# Patient Record
Sex: FEMALE | Race: WHITE | NOT HISPANIC OR LATINO | URBAN - METROPOLITAN AREA
[De-identification: names, ages, dates, MRNs, and addresses within clinical notes are randomized per-mention and may not be internally consistent; named-entity substitution may affect disease eponyms.]

---

## 2017-12-21 PROBLEM — Z00.00 ENCOUNTER FOR PREVENTIVE HEALTH EXAMINATION: Status: ACTIVE | Noted: 2017-12-21

## 2018-01-27 ENCOUNTER — INPATIENT (INPATIENT)
Facility: HOSPITAL | Age: 23
LOS: 2 days | Discharge: HOME | End: 2018-01-30
Attending: OBSTETRICS & GYNECOLOGY | Admitting: OBSTETRICS & GYNECOLOGY

## 2018-02-01 DIAGNOSIS — Z3A.41 41 WEEKS GESTATION OF PREGNANCY: ICD-10-CM

## 2018-02-01 DIAGNOSIS — D50.9 IRON DEFICIENCY ANEMIA, UNSPECIFIED: ICD-10-CM

## 2018-02-01 DIAGNOSIS — Z33.1 PREGNANT STATE, INCIDENTAL: ICD-10-CM

## 2019-08-08 ENCOUNTER — ASOB RESULT (OUTPATIENT)
Age: 24
End: 2019-08-08

## 2019-08-08 ENCOUNTER — APPOINTMENT (OUTPATIENT)
Dept: ANTEPARTUM | Facility: CLINIC | Age: 24
End: 2019-08-08
Payer: MEDICAID

## 2019-08-08 PROCEDURE — 76811 OB US DETAILED SNGL FETUS: CPT

## 2019-12-16 ENCOUNTER — INPATIENT (INPATIENT)
Facility: HOSPITAL | Age: 24
LOS: 1 days | Discharge: HOME | End: 2019-12-18
Attending: OBSTETRICS & GYNECOLOGY | Admitting: OBSTETRICS & GYNECOLOGY
Payer: MEDICAID

## 2019-12-16 VITALS — SYSTOLIC BLOOD PRESSURE: 133 MMHG | DIASTOLIC BLOOD PRESSURE: 79 MMHG | HEART RATE: 96 BPM

## 2019-12-16 LAB
AMPHET UR-MCNC: NEGATIVE — SIGNIFICANT CHANGE UP
APPEARANCE UR: ABNORMAL
BACTERIA # UR AUTO: ABNORMAL
BARBITURATES UR SCN-MCNC: NEGATIVE — SIGNIFICANT CHANGE UP
BASOPHILS # BLD AUTO: 0.05 K/UL — SIGNIFICANT CHANGE UP (ref 0–0.2)
BASOPHILS NFR BLD AUTO: 0.4 % — SIGNIFICANT CHANGE UP (ref 0–1)
BENZODIAZ UR-MCNC: NEGATIVE — SIGNIFICANT CHANGE UP
BILIRUB UR-MCNC: NEGATIVE — SIGNIFICANT CHANGE UP
BLD GP AB SCN SERPL QL: SIGNIFICANT CHANGE UP
BUPRENORPHINE SCREEN, URINE RESULT: NEGATIVE — SIGNIFICANT CHANGE UP
COCAINE METAB.OTHER UR-MCNC: NEGATIVE — SIGNIFICANT CHANGE UP
COLOR SPEC: YELLOW — SIGNIFICANT CHANGE UP
DIFF PNL FLD: NEGATIVE — SIGNIFICANT CHANGE UP
EOSINOPHIL # BLD AUTO: 0.34 K/UL — SIGNIFICANT CHANGE UP (ref 0–0.7)
EOSINOPHIL NFR BLD AUTO: 3 % — SIGNIFICANT CHANGE UP (ref 0–8)
EPI CELLS # UR: 5 /HPF — SIGNIFICANT CHANGE UP (ref 0–5)
FENTANYL UR QL: NEGATIVE — SIGNIFICANT CHANGE UP
GLUCOSE UR QL: NEGATIVE — SIGNIFICANT CHANGE UP
HCT VFR BLD CALC: 37.5 % — SIGNIFICANT CHANGE UP (ref 37–47)
HGB BLD-MCNC: 12.1 G/DL — SIGNIFICANT CHANGE UP (ref 12–16)
HYALINE CASTS # UR AUTO: 3 /LPF — SIGNIFICANT CHANGE UP (ref 0–7)
IMM GRANULOCYTES NFR BLD AUTO: 1.1 % — HIGH (ref 0.1–0.3)
KETONES UR-MCNC: SIGNIFICANT CHANGE UP
L&D DRUG SCREEN, URINE: SIGNIFICANT CHANGE UP
LEUKOCYTE ESTERASE UR-ACNC: ABNORMAL
LYMPHOCYTES # BLD AUTO: 1.81 K/UL — SIGNIFICANT CHANGE UP (ref 1.2–3.4)
LYMPHOCYTES # BLD AUTO: 15.8 % — LOW (ref 20.5–51.1)
MCHC RBC-ENTMCNC: 28.3 PG — SIGNIFICANT CHANGE UP (ref 27–31)
MCHC RBC-ENTMCNC: 32.3 G/DL — SIGNIFICANT CHANGE UP (ref 32–37)
MCV RBC AUTO: 87.6 FL — SIGNIFICANT CHANGE UP (ref 81–99)
METHADONE UR-MCNC: NEGATIVE — SIGNIFICANT CHANGE UP
MONOCYTES # BLD AUTO: 1.28 K/UL — HIGH (ref 0.1–0.6)
MONOCYTES NFR BLD AUTO: 11.2 % — HIGH (ref 1.7–9.3)
NEUTROPHILS # BLD AUTO: 7.84 K/UL — HIGH (ref 1.4–6.5)
NEUTROPHILS NFR BLD AUTO: 68.5 % — SIGNIFICANT CHANGE UP (ref 42.2–75.2)
NITRITE UR-MCNC: NEGATIVE — SIGNIFICANT CHANGE UP
NRBC # BLD: 0 /100 WBCS — SIGNIFICANT CHANGE UP (ref 0–0)
OPIATES UR-MCNC: NEGATIVE — SIGNIFICANT CHANGE UP
OXYCODONE UR-MCNC: NEGATIVE — SIGNIFICANT CHANGE UP
PCP UR-MCNC: NEGATIVE — SIGNIFICANT CHANGE UP
PH UR: 6 — SIGNIFICANT CHANGE UP (ref 5–8)
PLATELET # BLD AUTO: 183 K/UL — SIGNIFICANT CHANGE UP (ref 130–400)
PRENATAL SYPHILIS TEST: SIGNIFICANT CHANGE UP
PROPOXYPHENE QUALITATIVE URINE RESULT: NEGATIVE — SIGNIFICANT CHANGE UP
PROT UR-MCNC: ABNORMAL
RBC # BLD: 4.28 M/UL — SIGNIFICANT CHANGE UP (ref 4.2–5.4)
RBC # FLD: 14.3 % — SIGNIFICANT CHANGE UP (ref 11.5–14.5)
RBC CASTS # UR COMP ASSIST: 5 /HPF — HIGH (ref 0–4)
SP GR SPEC: 1.04 — HIGH (ref 1.01–1.02)
UROBILINOGEN FLD QL: SIGNIFICANT CHANGE UP
WBC # BLD: 11.45 K/UL — HIGH (ref 4.8–10.8)
WBC # FLD AUTO: 11.45 K/UL — HIGH (ref 4.8–10.8)
WBC UR QL: 21 /HPF — HIGH (ref 0–5)

## 2019-12-16 PROCEDURE — 59400 OBSTETRICAL CARE: CPT | Mod: U9

## 2019-12-16 RX ORDER — SIMETHICONE 80 MG/1
80 TABLET, CHEWABLE ORAL EVERY 4 HOURS
Refills: 0 | Status: DISCONTINUED | OUTPATIENT
Start: 2019-12-16 | End: 2019-12-18

## 2019-12-16 RX ORDER — DIBUCAINE 1 %
1 OINTMENT (GRAM) RECTAL EVERY 6 HOURS
Refills: 0 | Status: DISCONTINUED | OUTPATIENT
Start: 2019-12-16 | End: 2019-12-18

## 2019-12-16 RX ORDER — OXYTOCIN 10 UNIT/ML
41.67 VIAL (ML) INJECTION
Qty: 20 | Refills: 0 | Status: DISCONTINUED | OUTPATIENT
Start: 2019-12-16 | End: 2019-12-18

## 2019-12-16 RX ORDER — DINOPROSTONE 10 MG/241MG
10 INSERT VAGINAL ONCE
Refills: 0 | Status: COMPLETED | OUTPATIENT
Start: 2019-12-16 | End: 2019-12-16

## 2019-12-16 RX ORDER — OXYTOCIN 10 UNIT/ML
2 VIAL (ML) INJECTION
Qty: 30 | Refills: 0 | Status: DISCONTINUED | OUTPATIENT
Start: 2019-12-16 | End: 2019-12-18

## 2019-12-16 RX ORDER — IBUPROFEN 200 MG
600 TABLET ORAL EVERY 6 HOURS
Refills: 0 | Status: DISCONTINUED | OUTPATIENT
Start: 2019-12-16 | End: 2019-12-18

## 2019-12-16 RX ORDER — HYDROCORTISONE 1 %
1 OINTMENT (GRAM) TOPICAL EVERY 6 HOURS
Refills: 0 | Status: DISCONTINUED | OUTPATIENT
Start: 2019-12-16 | End: 2019-12-18

## 2019-12-16 RX ORDER — OXYTOCIN 10 UNIT/ML
333.33 VIAL (ML) INJECTION
Qty: 20 | Refills: 0 | Status: DISCONTINUED | OUTPATIENT
Start: 2019-12-16 | End: 2019-12-18

## 2019-12-16 RX ORDER — AMPICILLIN TRIHYDRATE 250 MG
1 CAPSULE ORAL EVERY 4 HOURS
Refills: 0 | Status: DISCONTINUED | OUTPATIENT
Start: 2019-12-16 | End: 2019-12-16

## 2019-12-16 RX ORDER — OXYTOCIN 10 UNIT/ML
1 VIAL (ML) INJECTION
Qty: 30 | Refills: 0 | Status: DISCONTINUED | OUTPATIENT
Start: 2019-12-16 | End: 2019-12-18

## 2019-12-16 RX ORDER — KETOROLAC TROMETHAMINE 30 MG/ML
30 SYRINGE (ML) INJECTION ONCE
Refills: 0 | Status: DISCONTINUED | OUTPATIENT
Start: 2019-12-16 | End: 2019-12-16

## 2019-12-16 RX ORDER — MAGNESIUM HYDROXIDE 400 MG/1
30 TABLET, CHEWABLE ORAL
Refills: 0 | Status: DISCONTINUED | OUTPATIENT
Start: 2019-12-16 | End: 2019-12-18

## 2019-12-16 RX ORDER — OXYCODONE HYDROCHLORIDE 5 MG/1
5 TABLET ORAL
Refills: 0 | Status: DISCONTINUED | OUTPATIENT
Start: 2019-12-16 | End: 2019-12-18

## 2019-12-16 RX ORDER — AER TRAVELER 0.5 G/1
1 SOLUTION RECTAL; TOPICAL EVERY 4 HOURS
Refills: 0 | Status: DISCONTINUED | OUTPATIENT
Start: 2019-12-16 | End: 2019-12-18

## 2019-12-16 RX ORDER — SODIUM CHLORIDE 9 MG/ML
1000 INJECTION, SOLUTION INTRAVENOUS
Refills: 0 | Status: DISCONTINUED | OUTPATIENT
Start: 2019-12-16 | End: 2019-12-16

## 2019-12-16 RX ORDER — IBUPROFEN 200 MG
600 TABLET ORAL EVERY 6 HOURS
Refills: 0 | Status: COMPLETED | OUTPATIENT
Start: 2019-12-16 | End: 2020-11-13

## 2019-12-16 RX ORDER — LANOLIN
1 OINTMENT (GRAM) TOPICAL EVERY 6 HOURS
Refills: 0 | Status: DISCONTINUED | OUTPATIENT
Start: 2019-12-16 | End: 2019-12-18

## 2019-12-16 RX ORDER — AMPICILLIN TRIHYDRATE 250 MG
2 CAPSULE ORAL ONCE
Refills: 0 | Status: COMPLETED | OUTPATIENT
Start: 2019-12-16 | End: 2019-12-16

## 2019-12-16 RX ORDER — GLYCERIN ADULT
1 SUPPOSITORY, RECTAL RECTAL AT BEDTIME
Refills: 0 | Status: DISCONTINUED | OUTPATIENT
Start: 2019-12-16 | End: 2019-12-18

## 2019-12-16 RX ORDER — OXYCODONE HYDROCHLORIDE 5 MG/1
5 TABLET ORAL ONCE
Refills: 0 | Status: DISCONTINUED | OUTPATIENT
Start: 2019-12-16 | End: 2019-12-18

## 2019-12-16 RX ORDER — DIPHENHYDRAMINE HCL 50 MG
25 CAPSULE ORAL EVERY 6 HOURS
Refills: 0 | Status: DISCONTINUED | OUTPATIENT
Start: 2019-12-16 | End: 2019-12-18

## 2019-12-16 RX ORDER — ACETAMINOPHEN 500 MG
975 TABLET ORAL
Refills: 0 | Status: DISCONTINUED | OUTPATIENT
Start: 2019-12-16 | End: 2019-12-18

## 2019-12-16 RX ORDER — BENZOCAINE 10 %
1 GEL (GRAM) MUCOUS MEMBRANE EVERY 6 HOURS
Refills: 0 | Status: DISCONTINUED | OUTPATIENT
Start: 2019-12-16 | End: 2019-12-18

## 2019-12-16 RX ADMIN — Medication 216 GRAM(S): at 01:43

## 2019-12-16 RX ADMIN — Medication 30 MILLIGRAM(S): at 18:09

## 2019-12-16 RX ADMIN — Medication 108 GRAM(S): at 14:52

## 2019-12-16 RX ADMIN — Medication 1 ENEMA: at 08:47

## 2019-12-16 RX ADMIN — Medication 1 MILLIUNIT(S)/MIN: at 10:14

## 2019-12-16 RX ADMIN — DINOPROSTONE 10 MILLIGRAM(S): 10 INSERT VAGINAL at 02:25

## 2019-12-16 RX ADMIN — Medication 125 MILLIUNIT(S)/MIN: at 18:13

## 2019-12-16 RX ADMIN — Medication 108 GRAM(S): at 10:50

## 2019-12-16 RX ADMIN — Medication 108 GRAM(S): at 05:40

## 2019-12-16 NOTE — PROGRESS NOTE ADULT - ASSESSMENT
A/P:  24y  at 41w0d, GBS pos, Rh neg, s/p rhogam, IOL for post EDC, s/p cervidil, currently on pitocin, s/p AROM  -cont EFM/toco  -clear liquid diet, IVF  -pain management with epidural  -cont ampicillin for GBS prophylaxis  -f/u ucx, UDS    Dr. Burrell and Dr. Myers aware.

## 2019-12-16 NOTE — OB PROVIDER H&P - NSHPPHYSICALEXAM_GEN_ALL_CORE
Vital Signs Last 24 Hrs  T(C): 37 (16 Dec 2019 01:19), Max: 37 (16 Dec 2019 01:19)  T(F): 98.6 (16 Dec 2019 01:19), Max: 98.6 (16 Dec 2019 01:19)  HR: 96 (16 Dec 2019 01:19) (96 - 96)  BP: 133/79 (16 Dec 2019 01:19) (133/79 - 133/79)  BP(mean): --  RR: 22 (16 Dec 2019 01:19) (22 - 22)  SpO2: --  Gen: NAD, A&OX3  Abd: gravid soft nt no palpable ctx  VE: C/60/-2, soft, vtx by sono, intact

## 2019-12-16 NOTE — PROGRESS NOTE ADULT - SUBJECTIVE AND OBJECTIVE BOX
PGY1 Note    Patient seen at bedside for evaluation of labor progression, doing well, no complaints.     Vital Signs Last 24 Hrs  T(C): 37 (16 Dec 2019 01:19), Max: 37 (16 Dec 2019 01:19)  T(F): 98.6 (16 Dec 2019 01:19), Max: 98.6 (16 Dec 2019 01:19)  HR: 89 (16 Dec 2019 11:41) (85 - 114)  BP: 114/67 (16 Dec 2019 11:36) (99/57 - 135/78)  RR: 22 (16 Dec 2019 01:19) (22 - 22)  SpO2: 100% (16 Dec 2019 11:36) (87% - 100%)      EFM: 130/mod gricel/+accels  TOCO: q2-3 mins  SVE: 2/70/-2, vtx, intact    Medications:  ampicillin  IVPB: 216 mL/Hr (19 @ 01:43)  ampicillin  IVPB: 108 mL/Hr (19 @ 10:50),  108 mL/Hr (19 @ 05:40)  dinoprostone Insert: 10 milliGRAM(s) (19 @ 02:25) - Cervidil out at 1000  oxytocin Infusion: 1 mL/Hr (19 @ 10:04) currently @4mU/min  saline laxative (FLEET) Rectal Enema: 1 Enema (19 @ 08:47)      Labs:                        12.1   11.45 )-----------( 183      ( 16 Dec 2019 02:08 )             37.5           ABO RH Interpretation: A NEG (19 @ 07:13)    Antibody Screen: NEG (19 @ 07:13)    Urinalysis Basic - ( 16 Dec 2019 02:08 )    Color: Yellow / Appearance: Slightly Turbid / S.038 / pH: x  Gluc: x / Ketone: Trace  / Bili: Negative / Urobili: <2 mg/dL   Blood: x / Protein: 30 mg/dL / Nitrite: Negative   Leuk Esterase: Moderate / RBC: 5 /HPF / WBC 21 /HPF   Sq Epi: x / Non Sq Epi: 5 /HPF / Bacteria: Few      L&amp;D Drug Screen, Urine: Done (12-16-19 @ 02:08)    Prenatal Syphilis Test: Nonreact (19 @ 02:08)

## 2019-12-16 NOTE — OB PROVIDER H&P - NSHPLABSRESULTS_GEN_ALL_CORE
GCT 88  Refused AFP  Bile acids drawn 12/13 pending      Rhogam given 4/11/19, 9/20/19    Sonograms:   6wks: IUP, subchorionic hemorrhage  4/15/2019 SIUP  7.2wks: SIUP, clot noted  8.1wks: SIUP, clot noted  8/8/19 anterior placenta, MVP nl, AGA, normal anatomy, CL 4.45cm, normal ovaries b/l

## 2019-12-16 NOTE — OB PROVIDER H&P - NS_OBGYNHISTORY_OBGYN_ALL_OB_FT
OB: 10/13/16 M 40wks 8/8 [facility] uncomplicated  1/28/2018 F 41wk 8/14 [facility] uncomplicated  GYN: Denies abnormal paps, fibroids, cysts, STIs

## 2019-12-16 NOTE — OB PROVIDER DELIVERY SUMMARY - NSPROVIDERDELIVERYNOTE_OBGYN_ALL_OB_FT
Patient fully dilated, OA, pushed to deliver viable .  Apgar 9/9.  Placenta intact with 3vc.  Cervix, vagina intact.  First degree perineal laceration repaired with 3-0 chromic.   cc.  Tolerated well.  Infant to wbn.  mother received GBS prophylaxis.

## 2019-12-16 NOTE — OB PROVIDER H&P - ASSESSMENT
23yo  at 41wks GBS pos, RH neg s/o rhogam for IOL for postdates    - Admit to L&D  - Admission labs  - IV fluids  - Cont toco and efm  - Clear liquid diet  - Pain management PRN  - Cervidil IOL  - Amp      Dr. Burrell aware

## 2019-12-16 NOTE — PROGRESS NOTE ADULT - ASSESSMENT
A/P:   24y  at 41w0d, GBS pos, Rh neg, s/p rhogam, IOL for post EDC, s/p cervidil, currently on pitocin    -continue pitocin and ampicillin  -pain management prn  -cont efm/toco  -f/u pending labs  -cont to monitor vitals  -cont iv hydration    Will make Dr. Burrell and Dr. Myers aware

## 2019-12-16 NOTE — PROGRESS NOTE ADULT - SUBJECTIVE AND OBJECTIVE BOX
PGY 1 Note    Patient seen at bedside for evaluation of labor progression/decels.  Reports painful/no ctx. Comfortable s/p epidural.    Vital Signs Last 24 Hrs  T(C): 36.8 (16 Dec 2019 14:59), Max: 37 (16 Dec 2019 01:19)  T(F): 98.24 (16 Dec 2019 14:59), Max: 98.6 (16 Dec 2019 01:19)  HR: 79 (16 Dec 2019 15:26) (77 - 114)  BP: 114/70 (16 Dec 2019 15:26) (98/51 - 135/78)  BP(mean): --  RR: 16 (16 Dec 2019 14:59) (16 - 22)  SpO2: 100% (16 Dec 2019 15:26) (87% - 100%)      EFM:  TOCO:  SVE:     Labs:                        12.1   11.45 )-----------( 183      ( 16 Dec 2019 02:08 )             37.5           ABO RH Interpretation: A NEG (19 @ 07:13)    Urinalysis Basic - ( 16 Dec 2019 02:08 )    Color: Yellow / Appearance: Slightly Turbid / S.038 / pH: x  Gluc: x / Ketone: Trace  / Bili: Negative / Urobili: <2 mg/dL   Blood: x / Protein: 30 mg/dL / Nitrite: Negative   Leuk Esterase: Moderate / RBC: 5 /HPF / WBC 21 /HPF   Sq Epi: x / Non Sq Epi: 5 /HPF / Bacteria: Few          Meds: ampicillin  IVPB 1 Gram(s) IV Intermittent every 4 hours  oxytocin Infusion 1 milliUNIT(s)/Min IV Continuous <Continuous>  ampicillin  IVPB 2 Gram(s) IV Intermittent once  dinoprostone Insert 10 milliGRAM(s) Vaginal once  saline laxative (FLEET) Rectal Enema 1 Enema Rectal once      A/P:  24y Gestational Age  , in labor  -cont EFM/toco  -clear liquid diet, IVF  -pain management prn/with epidural  -f/u    Dr. marvin. PGY 1 Note    Patient seen at bedside for evaluation of labor progression.  Comfortable s/p epidural.      Vital Signs Last 24 Hrs  T(C): 36.8 (16 Dec 2019 14:59), Max: 37 (16 Dec 2019 01:19)  T(F): 98.24 (16 Dec 2019 14:59), Max: 98.6 (16 Dec 2019 01:19)  HR: 79 (16 Dec 2019 15:26) (77 - 114)  BP: 114/70 (16 Dec 2019 15:26) (98/51 - 135/78)  RR: 16 (16 Dec 2019 14:59) (16 - 22)  SpO2: 100% (16 Dec 2019 15:26) (87% - 100%)      EFM:  TOCO:  SVE:     Labs:                        12.1   11.45 )-----------( 183      ( 16 Dec 2019 02:08 )             37.5           ABO RH Interpretation: A NEG (19 @ 07:13)  antibody neg    Urinalysis Basic - ( 16 Dec 2019 02:08 )    Color: Yellow / Appearance: Slightly Turbid / S.038 / pH: x  Gluc: x / Ketone: Trace  / Bili: Negative / Urobili: <2 mg/dL   Blood: x / Protein: 30 mg/dL / Nitrite: Negative   Leuk Esterase: Moderate / RBC: 5 /HPF / WBC 21 /HPF   Sq Epi: x / Non Sq Epi: 5 /HPF / Bacteria: Few      UDS neg  RPR NR          Meds: ampicillin  IVPB 1 Gram(s) IV Intermittent every 4 hours  oxytocin Infusion 1 milliUNIT(s)/Min IV Continuous <Continuous>  ampicillin  IVPB 2 Gram(s) IV Intermittent once  dinoprostone Insert 10 milliGRAM(s) Vaginal once  saline laxative (FLEET) Rectal Enema 1 Enema Rectal once

## 2019-12-16 NOTE — OB RN PATIENT PROFILE - ALCOHOL USE HISTORY SINGLE SELECT
Other (Free Text): SK right  upper area with purpura same site. Reassurance. No treatment Note Text (......Xxx Chief Complaint.): This diagnosis correlates with the Detail Level: Detailed Other (Free Text): No pigment or nodules at scar. No enlarged lymph nodes left supratrapezius region, axillae, or groins never

## 2019-12-16 NOTE — OB PROVIDER H&P - ATTENDING COMMENTS
23 y/o p2002 at 41 weeks with a history of PUPPS this pregnancy presents for induction of labor secondary to post dates and PUPPS.  Good fm, no rom, no bleeding    pnc: nsd x 2, no problems  rh neg, received Rhogam  gbs +  abd: d=9876 g, nt  ext: no edema  cx: 1/50/-2/i/adeq  nst: reactive  toco: irreg ctx  admit, analgesia, abx, cervidil, r/b/a rev, questions answered, then Pitocin - r/b/a reviewed, questions answered, anticipate nsd

## 2019-12-16 NOTE — PROGRESS NOTE ADULT - SUBJECTIVE AND OBJECTIVE BOX
PGY 1 Note    Patient seen at bedside for evaluation of labor progression. Comfortable s/p epidural.  S/p AROM clear @1314.    Vital Signs Last 24 Hrs  T(C): 37 (16 Dec 2019 01:19), Max: 37 (16 Dec 2019 01:19)  T(F): 98.6 (16 Dec 2019 01:19), Max: 98.6 (16 Dec 2019 01:19)  HR: 98 (16 Dec 2019 13:21) (77 - 114)  BP: 98/51 (16 Dec 2019 13:08) (98/51 - 135/78)  RR: 18 (16 Dec 2019 12:53) (18 - 22)  SpO2: 99% (16 Dec 2019 13:16) (87% - 100%)      EFM: 130/mod/+accel, cat I  TOCO: q2-3m  SVE: 3/80/-2, AROM clear @1314    Labs:                        12.1   11.45 )-----------( 183      ( 16 Dec 2019 02:08 )             37.5           ABO RH Interpretation: A NEG (19 @ 07:13)  antibody neg  RPR NR    Urinalysis Basic - ( 16 Dec 2019 02:08 )    Color: Yellow / Appearance: Slightly Turbid / S.038 / pH: x  Gluc: x / Ketone: Trace  / Bili: Negative / Urobili: <2 mg/dL   Blood: x / Protein: 30 mg/dL / Nitrite: Negative   Leuk Esterase: Moderate / RBC: 5 /HPF / WBC 21 /HPF   Sq Epi: x / Non Sq Epi: 5 /HPF / Bacteria: Few      Meds: ampicillin  IVPB 1 Gram(s) IV Intermittent every 4 hours, last dose @1050  oxytocin Infusion 1 milliUNIT(s)/Min IV Continuous <Continuous>, started  @1309, now at 2mu/min  dinoprostone Insert 10 milliGRAM(s) Vaginal once  saline laxative (FLEET) Rectal Enema 1 Enema Rectal once

## 2019-12-16 NOTE — PROGRESS NOTE ADULT - ASSESSMENT
A/P:  24y  @41w0d, GBS pos, Rh neg, IOL for postdates  -cont cervidil for induction  -cont EFM/toco  -clear liquid diet, IVF  -pain management prn/with epidural  -f/u ucx, UDS    Dr. Burrell and Dr. Myers aware. A/P:  24y  @41w0d, GBS pos, Rh neg, IOL for postdates  -cont cervidil for induction  -cont EFM/toco  -clear liquid diet, IVF  -pain management prn  -cont ampicillin for GBS prophylaxis  -f/u ucx, UDS    Dr. Burrell and Dr. Myers aware.

## 2019-12-16 NOTE — PROGRESS NOTE ADULT - SUBJECTIVE AND OBJECTIVE BOX
PGY 1 Note    Patient seen at bedside for evaluation of labor progression.  Cervidil in, patient feels some cramping but not yet uncomfortable.     Vital Signs Last 24 Hrs  T(C): 37 (16 Dec 2019 01:19), Max: 37 (16 Dec 2019 01:19)  T(F): 98.6 (16 Dec 2019 01:19), Max: 98.6 (16 Dec 2019 01:19)  HR: 92 (16 Dec 2019 03:43) (92 - 96)  BP: 109/80 (16 Dec 2019 03:43) (109/80 - 133/79)  RR: 22 (16 Dec 2019 01:19) (22 - 22)    EFM: 120/mod/+accel, cat I  TOCO: q3m  SVE: 2/50/-2 @0818 per Dr. Burrell    Labs:                        12.1   11.45 )-----------( 183      ( 16 Dec 2019 02:08 )             37.5           ABO RH Interpretation: A NEG (19 @ 07:13)  antibody neg  RPR NR    Urinalysis Basic - ( 16 Dec 2019 02:08 )    Color: Yellow / Appearance: Slightly Turbid / S.038 / pH: x  Gluc: x / Ketone: Trace  / Bili: Negative / Urobili: <2 mg/dL   Blood: x / Protein: 30 mg/dL / Nitrite: Negative   Leuk Esterase: Moderate / RBC: 5 /HPF / WBC 21 /HPF   Sq Epi: x / Non Sq Epi: 5 /HPF / Bacteria: Few        Meds: ampicillin  IVPB 1 Gram(s) IV Intermittent every 4 hours  saline laxative (FLEET) Rectal Enema 1 Enema Rectal once  dinoprostone Insert 10 milliGRAM(s) Vaginal once  epidural @0830 PGY 1 Note    Patient seen at bedside for evaluation of labor progression.  Cervidil in, patient feels some cramping but not yet uncomfortable.     Vital Signs Last 24 Hrs  T(C): 37 (16 Dec 2019 01:19), Max: 37 (16 Dec 2019 01:19)  T(F): 98.6 (16 Dec 2019 01:19), Max: 98.6 (16 Dec 2019 01:19)  HR: 92 (16 Dec 2019 03:43) (92 - 96)  BP: 109/80 (16 Dec 2019 03:43) (109/80 - 133/79)  RR: 22 (16 Dec 2019 01:19) (22 - 22)    EFM: 120/mod/+accel, cat I  TOCO: q3m  SVE: 2/50/-2 @0818 per Dr. Burrell    Labs:                        12.1   11.45 )-----------( 183      ( 16 Dec 2019 02:08 )             37.5           ABO RH Interpretation: A NEG (19 @ 07:13)  antibody neg  RPR NR    Urinalysis Basic - ( 16 Dec 2019 02:08 )    Color: Yellow / Appearance: Slightly Turbid / S.038 / pH: x  Gluc: x / Ketone: Trace  / Bili: Negative / Urobili: <2 mg/dL   Blood: x / Protein: 30 mg/dL / Nitrite: Negative   Leuk Esterase: Moderate / RBC: 5 /HPF / WBC 21 /HPF   Sq Epi: x / Non Sq Epi: 5 /HPF / Bacteria: Few        Meds: ampicillin  IVPB 1 Gram(s) IV Intermittent every 4 hours  saline laxative (FLEET) Rectal Enema 1 Enema Rectal once  dinoprostone Insert 10 milliGRAM(s) Vaginal once

## 2019-12-16 NOTE — PROGRESS NOTE ADULT - ASSESSMENT
A/P:  25yo  at 41w0d, GBS pos, Rh neg, s/p rhogam, r/o gHTN, IOL for post EDC, s/p cervidil, currently on pitocin, s/p AROM  -cont EFM/toco  -clear liquid diet, IVF  -pain management prn/with epidural  -f/u ucx    Dr. marvin.

## 2019-12-17 LAB
BASOPHILS # BLD AUTO: 0.05 K/UL — SIGNIFICANT CHANGE UP (ref 0–0.2)
BASOPHILS NFR BLD AUTO: 0.5 % — SIGNIFICANT CHANGE UP (ref 0–1)
CULTURE RESULTS: SIGNIFICANT CHANGE UP
EOSINOPHIL # BLD AUTO: 0.34 K/UL — SIGNIFICANT CHANGE UP (ref 0–0.7)
EOSINOPHIL NFR BLD AUTO: 3.4 % — SIGNIFICANT CHANGE UP (ref 0–8)
HCT VFR BLD CALC: 33.6 % — LOW (ref 37–47)
HGB BLD-MCNC: 10.6 G/DL — LOW (ref 12–16)
IMM GRANULOCYTES NFR BLD AUTO: 1.2 % — HIGH (ref 0.1–0.3)
LYMPHOCYTES # BLD AUTO: 1.73 K/UL — SIGNIFICANT CHANGE UP (ref 1.2–3.4)
LYMPHOCYTES # BLD AUTO: 17.4 % — LOW (ref 20.5–51.1)
MCHC RBC-ENTMCNC: 28.3 PG — SIGNIFICANT CHANGE UP (ref 27–31)
MCHC RBC-ENTMCNC: 31.5 G/DL — LOW (ref 32–37)
MCV RBC AUTO: 89.6 FL — SIGNIFICANT CHANGE UP (ref 81–99)
MONOCYTES # BLD AUTO: 1.1 K/UL — HIGH (ref 0.1–0.6)
MONOCYTES NFR BLD AUTO: 11.1 % — HIGH (ref 1.7–9.3)
NEUTROPHILS # BLD AUTO: 6.6 K/UL — HIGH (ref 1.4–6.5)
NEUTROPHILS NFR BLD AUTO: 66.4 % — SIGNIFICANT CHANGE UP (ref 42.2–75.2)
NRBC # BLD: 0 /100 WBCS — SIGNIFICANT CHANGE UP (ref 0–0)
PLATELET # BLD AUTO: 145 K/UL — SIGNIFICANT CHANGE UP (ref 130–400)
RBC # BLD: 3.75 M/UL — LOW (ref 4.2–5.4)
RBC # FLD: 14.3 % — SIGNIFICANT CHANGE UP (ref 11.5–14.5)
SPECIMEN SOURCE: SIGNIFICANT CHANGE UP
WBC # BLD: 9.94 K/UL — SIGNIFICANT CHANGE UP (ref 4.8–10.8)
WBC # FLD AUTO: 9.94 K/UL — SIGNIFICANT CHANGE UP (ref 4.8–10.8)

## 2019-12-17 RX ADMIN — Medication 600 MILLIGRAM(S): at 05:38

## 2019-12-17 RX ADMIN — Medication 975 MILLIGRAM(S): at 03:52

## 2019-12-17 RX ADMIN — Medication 25 MILLIGRAM(S): at 23:25

## 2019-12-17 RX ADMIN — Medication 600 MILLIGRAM(S): at 23:17

## 2019-12-17 NOTE — PROGRESS NOTE ADULT - SUBJECTIVE AND OBJECTIVE BOX
OB attending  PPD #1    Pt doing well, pain well controlled. No overnight events, no acute complaints.    Ambulating: Yes  Voiding: Yes  Flatus: Yes  Bowel movements: Yes   Breast or bottle feeding: Breastfeeding  Diet: Regular    PAST MEDICAL & SURGICAL HISTORY:  No pertinent past medical history  No significant past surgical history      Physical Exam  Vital Signs Last 24 Hrs  T(C): 36.5 (17 Dec 2019 07:54), Max: 37.1 (16 Dec 2019 17:32)  T(F): 97.7 (17 Dec 2019 07:54), Max: 98.8 (16 Dec 2019 17:32)  HR: 89 (17 Dec 2019 07:54) (61 - 141)  BP: 109/71 (17 Dec 2019 07:54) (93/50 - 137/65)  BP(mean): --  RR: 18 (17 Dec 2019 07:54) (16 - 20)  SpO2: 81% (16 Dec 2019 17:12) (81% - 100%)  Gen: AAOx3, NAD  Abd: Soft, nontender, nondistended, BS+  Fundus: Firm, below umbilicus  Lochia: normal  Ext: No calf tenderness, no swelling    Labs:                        10.6   9.94  )-----------( 145      ( 17 Dec 2019 05:44 )             33.6         A/P: s/p , PPD #1, doing well  - continue current management

## 2019-12-18 ENCOUNTER — TRANSCRIPTION ENCOUNTER (OUTPATIENT)
Age: 24
End: 2019-12-18

## 2019-12-18 VITALS
RESPIRATION RATE: 18 BRPM | TEMPERATURE: 97 F | DIASTOLIC BLOOD PRESSURE: 78 MMHG | SYSTOLIC BLOOD PRESSURE: 122 MMHG | HEART RATE: 80 BPM

## 2019-12-18 RX ORDER — IBUPROFEN 200 MG
1 TABLET ORAL
Qty: 0 | Refills: 0 | DISCHARGE
Start: 2019-12-18

## 2019-12-18 RX ADMIN — Medication 25 MILLIGRAM(S): at 08:01

## 2019-12-18 NOTE — PROGRESS NOTE ADULT - SUBJECTIVE AND OBJECTIVE BOX
OB attending  PPD #2    Pt doing well, pain well controlled. No overnight events, no acute complaints.    Ambulating: Yes  Voiding: Yes  Flatus: Yes  Bowel movements: Yes   Breast or bottle feeding: Breastfeeding  Diet: Regular    PAST MEDICAL & SURGICAL HISTORY:  No pertinent past medical history  No significant past surgical history      Physical Exam  Vital Signs Last 24 Hrs  T(C): 36 (18 Dec 2019 07:30), Max: 36.6 (17 Dec 2019 15:53)  T(F): 96.8 (18 Dec 2019 07:30), Max: 97.8 (17 Dec 2019 15:53)  HR: 80 (18 Dec 2019 07:30) (80 - 81)  BP: 122/78 (18 Dec 2019 07:30) (118/75 - 126/72)  BP(mean): --  RR: 18 (18 Dec 2019 07:30) (18 - 18)  SpO2: --  Gen: AAOx3, NAD  Abd: Soft, nontender, nondistended, BS+  Fundus: Firm, below umbilicus  Lochia: normal  Ext: No calf tenderness, no swelling    Labs:                        10.6   9.94  )-----------( 145      ( 17 Dec 2019 05:44 )             33.6         A/P: s/p , PPD #2, doing well  - d/c home

## 2019-12-18 NOTE — DISCHARGE NOTE NURSING/CASE MANAGEMENT/SOCIAL WORK - PATIENT PORTAL LINK FT
You can access the FollowMyHealth Patient Portal offered by [facility] by registering at the following website: [email or web address]. By joining [employer]’s FollowMyHealth portal, you will also be able to view your health information using other applications (apps) compatible with our system.

## 2019-12-18 NOTE — DISCHARGE NOTE OB - CARE PROVIDER_API CALL
Deep Lee)  Obstetrics and Gynecology  [address] [place] [address]  Phone: [phone]  Fax: [phone]  Follow Up Time:

## 2019-12-18 NOTE — DISCHARGE NOTE OB - HOSPITAL COURSE
DATE OF DISCHARGE: 19 @ 12:51    HISTORY OF PRESENT ILLNESS/HOSPITAL COURSE: HPI:  23yo  at 41wks, GBS positive, here for IOL for postdates. MIld cramping x 2 hrs. No VB, LOF. Good FM. HAd several UTIs this pregnany (3) all tx but did not go on suppression. Feels like she has a UTI now. GBS positive. Otherwise uncomplicated pregnancy. (16 Dec 2019 01:50)    PAST MEDICAL & SURGICAL HISTORY:  No pertinent past medical history  No significant past surgical history      PROCEDURES PERFORMED: vaginal delivery    COMPLICATIONS:  none    POST PARTUM COURSE: uncomplicated       FINAL DIAGNOSIS:  normal vaginal delivery    LABS:                       10.6   9.94  )-----------( 145      ( 17 Dec 2019 05:44 )             33.6

## 2019-12-18 NOTE — DISCHARGE NOTE OB - PATIENT PORTAL LINK FT
You can access the FollowMyHealth Patient Portal offered by Health system by registering at the following website: [email or web address]. By joining [employer]’s FollowMyHealth portal, you will also be able to view your health information using other applications (apps) compatible with our system.

## 2019-12-20 DIAGNOSIS — O48.0 POST-TERM PREGNANCY: ICD-10-CM

## 2019-12-20 DIAGNOSIS — Z3A.41 41 WEEKS GESTATION OF PREGNANCY: ICD-10-CM

## 2019-12-20 DIAGNOSIS — O26.86 PRURITIC URTICARIAL PAPULES AND PLAQUES OF PREGNANCY (PUPPP): ICD-10-CM

## 2021-03-09 ENCOUNTER — FORM ENCOUNTER (OUTPATIENT)
Age: 26
End: 2021-03-09

## 2021-10-17 ENCOUNTER — FORM ENCOUNTER (OUTPATIENT)
Age: 26
End: 2021-10-17

## 2021-10-18 VITALS
DIASTOLIC BLOOD PRESSURE: 73 MMHG | SYSTOLIC BLOOD PRESSURE: 106 MMHG | HEIGHT: 70 IN | BODY MASS INDEX: 26.2 KG/M2 | WEIGHT: 183 LBS

## 2022-10-25 PROBLEM — Z78.9 OTHER SPECIFIED HEALTH STATUS: Chronic | Status: ACTIVE | Noted: 2019-12-16

## 2022-10-26 ENCOUNTER — NON-APPOINTMENT (OUTPATIENT)
Age: 27
End: 2022-10-26

## 2022-10-26 DIAGNOSIS — Z87.42 PERSONAL HISTORY OF OTHER DISEASES OF THE FEMALE GENITAL TRACT: ICD-10-CM

## 2022-10-26 DIAGNOSIS — Z78.9 OTHER SPECIFIED HEALTH STATUS: ICD-10-CM

## 2022-10-26 DIAGNOSIS — Z98.890 OTHER SPECIFIED POSTPROCEDURAL STATES: ICD-10-CM

## 2022-10-26 DIAGNOSIS — Z84.89 FAMILY HISTORY OF OTHER SPECIFIED CONDITIONS: ICD-10-CM

## 2022-10-26 RX ORDER — ETONOGESTREL AND ETHINYL ESTRADIOL .12; .015 MG/D; MG/D
INSERT, EXTENDED RELEASE VAGINAL
Refills: 0 | Status: ACTIVE | COMMUNITY

## 2022-11-16 ENCOUNTER — APPOINTMENT (OUTPATIENT)
Dept: OBGYN | Facility: CLINIC | Age: 27
End: 2022-11-16

## 2022-11-16 VITALS — DIASTOLIC BLOOD PRESSURE: 78 MMHG | WEIGHT: 196 LBS | BODY MASS INDEX: 28.12 KG/M2 | SYSTOLIC BLOOD PRESSURE: 116 MMHG

## 2022-11-16 DIAGNOSIS — E04.9 NONTOXIC GOITER, UNSPECIFIED: ICD-10-CM

## 2022-11-16 DIAGNOSIS — Z78.9 OTHER SPECIFIED HEALTH STATUS: ICD-10-CM

## 2022-11-16 LAB
BILIRUB UR QL STRIP: NORMAL
GLUCOSE UR-MCNC: NORMAL
HCG UR QL: 0.2 EU/DL
HCG UR QL: NEGATIVE
HGB UR QL STRIP.AUTO: NORMAL
KETONES UR-MCNC: NORMAL
LEUKOCYTE ESTERASE UR QL STRIP: NORMAL
NITRITE UR QL STRIP: NORMAL
PH UR STRIP: 7
PROT UR STRIP-MCNC: NORMAL
QUALITY CONTROL: YES
SP GR UR STRIP: 1.01

## 2022-11-16 PROCEDURE — 99212 OFFICE O/P EST SF 10 MIN: CPT | Mod: 25

## 2022-11-16 PROCEDURE — 99395 PREV VISIT EST AGE 18-39: CPT

## 2022-11-16 PROCEDURE — 81003 URINALYSIS AUTO W/O SCOPE: CPT | Mod: QW

## 2022-11-16 PROCEDURE — 81025 URINE PREGNANCY TEST: CPT

## 2022-11-16 RX ORDER — ETONOGESTREL AND ETHINYL ESTRADIOL 11.7; 2.7 MG/1; MG/1
0.12-0.015 INSERT, EXTENDED RELEASE VAGINAL
Qty: 3 | Refills: 3 | Status: ACTIVE | COMMUNITY
Start: 2022-11-16 | End: 1900-01-01

## 2022-11-16 NOTE — HISTORY OF PRESENT ILLNESS
[FreeTextEntry1] : 26 y/o p3003 LMP 22 here for WWE. no abn bleeding, pain or discharge.  uses nuvaring continueously for 3 months at a time.  no breast complaints.\par \par grandfather  ca thyroid, family h/o nodules\par \par nsd x 3, largest 8-14\par  in tx for brain tumor\par \par \par no med hx\par no absolute contra\par no smoke

## 2022-11-16 NOTE — PHYSICAL EXAM
[Chaperone Present] : A chaperone was present in the examining room during all aspects of the physical examination [Appropriately responsive] : appropriately responsive [Alert] : alert [No Acute Distress] : no acute distress [Soft] : soft [Non-tender] : non-tender [Non-distended] : non-distended [No HSM] : No HSM [No Lesions] : no lesions [No Mass] : no mass [Oriented x3] : oriented x3 [Examination Of The Breasts] : a normal appearance [No Masses] : no breast masses were palpable [Labia Majora] : normal [Labia Minora] : normal [Normal] : normal [Uterine Adnexae] : normal [FreeTextEntry1] : Aida [FreeTextEntry3] : slightly enlarged  thyroid

## 2022-11-16 NOTE — PLAN
[FreeTextEntry1] : 28 y/o p3003 with normal exam\par stable\par bse/folate\par refill nuvaring, no absolute contra\par for thyroid sono\par f/u for annual\par additional time 10 min\par

## 2022-11-17 LAB
C TRACH RRNA SPEC QL NAA+PROBE: NOT DETECTED
N GONORRHOEA RRNA SPEC QL NAA+PROBE: NOT DETECTED
SOURCE AMPLIFICATION: NORMAL

## 2022-12-08 DIAGNOSIS — E07.9 DISORDER OF THYROID, UNSPECIFIED: ICD-10-CM

## 2022-12-26 NOTE — HISTORY OF PRESENT ILLNESS
[FreeTextEntry1] : spoke to patient and dr. jenkins, patient considering genetic testing, will do f/u thyroid scan 6 months, will also monitor the finding.  she is aware that there is a risk of malignancy and has a strong family hx of thyroid dz.\par questions answered

## 2023-01-18 ENCOUNTER — APPOINTMENT (OUTPATIENT)
Dept: OBGYN | Facility: CLINIC | Age: 28
End: 2023-01-18
Payer: MEDICAID

## 2023-01-18 PROCEDURE — 36415 COLL VENOUS BLD VENIPUNCTURE: CPT

## 2023-01-19 LAB
ALBUMIN SERPL ELPH-MCNC: 4.5 G/DL
ALP BLD-CCNC: 90 U/L
ALT SERPL-CCNC: 27 U/L
ANION GAP SERPL CALC-SCNC: 14 MMOL/L
AST SERPL-CCNC: 26 U/L
BASOPHILS # BLD AUTO: 0.1 K/UL
BASOPHILS NFR BLD AUTO: 1.3 %
BILIRUB SERPL-MCNC: 0.3 MG/DL
BUN SERPL-MCNC: 6 MG/DL
CALCIUM SERPL-MCNC: 9.9 MG/DL
CHLORIDE SERPL-SCNC: 101 MMOL/L
CO2 SERPL-SCNC: 24 MMOL/L
CREAT SERPL-MCNC: 0.68 MG/DL
EGFR: 123 ML/MIN/1.73M2
EOSINOPHIL # BLD AUTO: 0.07 K/UL
EOSINOPHIL NFR BLD AUTO: 0.9 %
GLUCOSE SERPL-MCNC: 109 MG/DL
HCT VFR BLD CALC: 44.1 %
HGB BLD-MCNC: 14.6 G/DL
IMM GRANULOCYTES NFR BLD AUTO: 0.3 %
LYMPHOCYTES # BLD AUTO: 1.9 K/UL
LYMPHOCYTES NFR BLD AUTO: 24 %
MAN DIFF?: NORMAL
MCHC RBC-ENTMCNC: 29.7 PG
MCHC RBC-ENTMCNC: 33.1 GM/DL
MCV RBC AUTO: 89.6 FL
MONOCYTES # BLD AUTO: 0.5 K/UL
MONOCYTES NFR BLD AUTO: 6.3 %
NEUTROPHILS # BLD AUTO: 5.34 K/UL
NEUTROPHILS NFR BLD AUTO: 67.2 %
PLATELET # BLD AUTO: 286 K/UL
POTASSIUM SERPL-SCNC: 4.4 MMOL/L
PROT SERPL-MCNC: 7.8 G/DL
RBC # BLD: 4.92 M/UL
RBC # FLD: 12.8 %
SODIUM SERPL-SCNC: 139 MMOL/L
TSH SERPL-ACNC: 1.17 UIU/ML
WBC # FLD AUTO: 7.93 K/UL

## 2023-09-05 RX ORDER — ETONOGESTREL AND ETHINYL ESTRADIOL 11.7; 2.7 MG/1; MG/1
0.12-0.015 INSERT, EXTENDED RELEASE VAGINAL
Qty: 3 | Refills: 0 | Status: ACTIVE | COMMUNITY
Start: 2023-09-05 | End: 1900-01-01

## 2024-01-11 ENCOUNTER — APPOINTMENT (OUTPATIENT)
Dept: OBGYN | Facility: CLINIC | Age: 29
End: 2024-01-11

## 2024-01-17 RX ORDER — ETONOGESTREL AND ETHINYL ESTRADIOL 11.7; 2.7 MG/1; MG/1
0.12-0.015 INSERT, EXTENDED RELEASE VAGINAL
Qty: 3 | Refills: 0 | Status: ACTIVE | COMMUNITY
Start: 2024-01-17 | End: 1900-01-01

## 2024-02-07 ENCOUNTER — APPOINTMENT (OUTPATIENT)
Dept: OBGYN | Facility: CLINIC | Age: 29
End: 2024-02-07
Payer: MEDICAID

## 2024-02-07 VITALS
DIASTOLIC BLOOD PRESSURE: 85 MMHG | SYSTOLIC BLOOD PRESSURE: 120 MMHG | BODY MASS INDEX: 28.14 KG/M2 | HEIGHT: 71 IN | WEIGHT: 201 LBS

## 2024-02-07 DIAGNOSIS — Z01.411 ENCOUNTER FOR GYNECOLOGICAL EXAMINATION (GENERAL) (ROUTINE) WITH ABNORMAL FINDINGS: ICD-10-CM

## 2024-02-07 LAB
BILIRUB UR QL STRIP: NORMAL
GLUCOSE UR-MCNC: NORMAL
HCG UR QL: 0.2 EU/DL
HCG UR QL: NEGATIVE
HGB UR QL STRIP.AUTO: NORMAL
KETONES UR-MCNC: NORMAL
LEUKOCYTE ESTERASE UR QL STRIP: NORMAL
NITRITE UR QL STRIP: NORMAL
PH UR STRIP: 5.5
PROT UR STRIP-MCNC: NORMAL
SP GR UR STRIP: 1.01

## 2024-02-07 PROCEDURE — 81003 URINALYSIS AUTO W/O SCOPE: CPT | Mod: QW

## 2024-02-07 PROCEDURE — 99395 PREV VISIT EST AGE 18-39: CPT

## 2024-02-07 PROCEDURE — 81025 URINE PREGNANCY TEST: CPT

## 2024-02-07 RX ORDER — ETONOGESTREL AND ETHINYL ESTRADIOL 11.7; 2.7 MG/1; MG/1
0.12-0.015 INSERT, EXTENDED RELEASE VAGINAL
Qty: 3 | Refills: 11 | Status: ACTIVE | COMMUNITY
Start: 2024-02-07 | End: 1900-01-01

## 2024-02-07 NOTE — PHYSICAL EXAM
[Chaperone Present] : A chaperone was present in the examining room during all aspects of the physical examination [FreeTextEntry1] : [place]iva [Appropriately responsive] : appropriately responsive [Alert] : alert [No Acute Distress] : no acute distress [No Lymphadenopathy] : no lymphadenopathy [Soft] : soft [Non-tender] : non-tender [Non-distended] : non-distended [No HSM] : No HSM [No Lesions] : no lesions [No Mass] : no mass [Oriented x3] : oriented x3 [Examination Of The Breasts] : a normal appearance [No Masses] : no breast masses were palpable [Labia Majora] : normal [Labia Minora] : normal [Normal] : normal [Uterine Adnexae] : normal

## 2024-02-07 NOTE — PLAN
[FreeTextEntry1] : 28 y/o p3 with normal exam stable pap sent from office bse/folate refill nuvaring, no absolute contra f/u for annual
I will STOP taking the medications listed below when I get home from the hospital:  None

## 2024-02-07 NOTE — HISTORY OF PRESENT ILLNESS
[FreeTextEntry1] : 30 y/o p3003 LMP /24here for WWE. no abn bleeding, pain or discharge. uses nuvaring continueously.      grandfather  ca thyroid, family h/o nodules, follows with dr. rodríguez ent at [facility]    nsd x 3, largest 8-14   in tx for brain tumor   non smoker no absolute contra to b.c.   no med hx

## 2024-02-09 LAB
C TRACH RRNA SPEC QL NAA+PROBE: NOT DETECTED
N GONORRHOEA RRNA SPEC QL NAA+PROBE: NOT DETECTED
SOURCE TP AMPLIFICATION: NORMAL

## 2024-02-14 LAB — CYTOLOGY CVX/VAG DOC THIN PREP: NORMAL

## 2024-03-13 ENCOUNTER — APPOINTMENT (OUTPATIENT)
Dept: OBGYN | Facility: CLINIC | Age: 29
End: 2024-03-13
Payer: MEDICAID

## 2024-03-13 DIAGNOSIS — Z30.430 ENCOUNTER FOR INSERTION OF INTRAUTERINE CONTRACEPTIVE DEVICE: ICD-10-CM

## 2024-03-13 LAB
BILIRUB UR QL STRIP: NORMAL
GLUCOSE UR-MCNC: NORMAL
HCG UR QL: 0.2 EU/DL
HCG UR QL: NEGATIVE
HGB UR QL STRIP.AUTO: NORMAL
KETONES UR-MCNC: NORMAL
LEUKOCYTE ESTERASE UR QL STRIP: NORMAL
NITRITE UR QL STRIP: NORMAL
PH UR STRIP: 7
PROT UR STRIP-MCNC: NORMAL
SP GR UR STRIP: 1.02

## 2024-03-13 PROCEDURE — 81003 URINALYSIS AUTO W/O SCOPE: CPT | Mod: QW

## 2024-03-13 PROCEDURE — 76830 TRANSVAGINAL US NON-OB: CPT

## 2024-03-13 PROCEDURE — 58300 INSERT INTRAUTERINE DEVICE: CPT

## 2024-03-13 PROCEDURE — 81025 URINE PREGNANCY TEST: CPT

## 2024-03-13 NOTE — PROCEDURE
[Transvaginal Ultrasound] : transvaginal ultrasound [Locate IUD] : locate IUD [Time out performed] : Pre-procedure time out performed.  Patient's name, date of birth and procedure confirmed. [IUD Placement] : intrauterine device (IUD) placement [Prevention of Pregnancy] : prevention of pregnancy [Consent Obtained] : Consent obtained [Risks] : risks [Alternatives] : alternatives [Benefits] : benefits [Patient] : patient [Infection] : infection [Pain] : pain [Bleeding] : bleeding [Expulsion] : expulsion [Failure] : failure [Uterine Perforation] : uterine perforation [Neg Pregnancy Test] : negative pregnancy test [No Premedication] : No premedication [Betadine] : Betadine [Tenaculum] : Tenaculum [ParaGard IUD] : ParaGard IUD [Easy Passage] : Easy passage [Tolerated Well] : Patient tolerated the procedure well [No Complications] : No complications [None] : None [FreeTextEntry3] : paragard IUD in ee

## 2024-09-26 ENCOUNTER — APPOINTMENT (OUTPATIENT)
Dept: OBGYN | Facility: CLINIC | Age: 29
End: 2024-09-26
Payer: MEDICAID

## 2024-09-26 VITALS
WEIGHT: 211 LBS | BODY MASS INDEX: 29.43 KG/M2 | HEART RATE: 80 BPM | DIASTOLIC BLOOD PRESSURE: 82 MMHG | SYSTOLIC BLOOD PRESSURE: 135 MMHG

## 2024-09-26 DIAGNOSIS — N92.6 IRREGULAR MENSTRUATION, UNSPECIFIED: ICD-10-CM

## 2024-09-26 DIAGNOSIS — Z30.40 ENCOUNTER FOR SURVEILLANCE OF CONTRACEPTIVES, UNSPECIFIED: ICD-10-CM

## 2024-09-26 LAB
BILIRUB UR QL STRIP: NORMAL
GLUCOSE UR-MCNC: NORMAL
HCG UR QL: 0.2 EU/DL
HGB UR QL STRIP.AUTO: NORMAL
KETONES UR-MCNC: NORMAL
LEUKOCYTE ESTERASE UR QL STRIP: NORMAL
NITRITE UR QL STRIP: NORMAL
PH UR STRIP: 5.5
PROT UR STRIP-MCNC: NORMAL
SP GR UR STRIP: >=1.03

## 2024-09-26 PROCEDURE — 81003 URINALYSIS AUTO W/O SCOPE: CPT | Mod: QW

## 2024-09-26 PROCEDURE — 99459 PELVIC EXAMINATION: CPT

## 2024-09-26 PROCEDURE — 36415 COLL VENOUS BLD VENIPUNCTURE: CPT

## 2024-09-26 PROCEDURE — 99214 OFFICE O/P EST MOD 30 MIN: CPT

## 2024-09-26 PROCEDURE — 76830 TRANSVAGINAL US NON-OB: CPT

## 2024-09-26 NOTE — HISTORY OF PRESENT ILLNESS
[FreeTextEntry1] : 28 y/o p3003 LMP 24, had Paragard IUD placed 3/23, here for evaluation of secondary amenorrhea. no pain or discharge.  Cycles now to 60-70 days.    grandfather  ca thyroid, family h/o nodules, follows with dr. rodríguez ent at [facility]  nsd x 3, largest 8-14   in tx for brain tumor non smoker no absolute contra to b.c.   no med hx

## 2024-09-26 NOTE — PLAN
[FreeTextEntry1] : 30 y/o p3 with irregular cycles and IUD stable labs sent from office folate precautions likely all due to hormonal imbalance precautions

## 2024-09-27 LAB
BASOPHILS # BLD AUTO: 0.05 K/UL
BASOPHILS NFR BLD AUTO: 0.6 %
CHOLEST SERPL-MCNC: 172 MG/DL
EOSINOPHIL # BLD AUTO: 0.04 K/UL
EOSINOPHIL NFR BLD AUTO: 0.5 %
ESTIMATED AVERAGE GLUCOSE: 103 MG/DL
FSH SERPL-MCNC: 4.5 IU/L
HBA1C MFR BLD HPLC: 5.2 %
HCG SERPL-MCNC: <1 MIU/ML
HCT VFR BLD CALC: 44.3 %
HDLC SERPL-MCNC: 47 MG/DL
HGB BLD-MCNC: 14.3 G/DL
IMM GRANULOCYTES NFR BLD AUTO: 0.4 %
LDLC SERPL CALC-MCNC: 106 MG/DL
LH SERPL-ACNC: 10.5 IU/L
LYMPHOCYTES # BLD AUTO: 2.12 K/UL
LYMPHOCYTES NFR BLD AUTO: 26.1 %
MAN DIFF?: NORMAL
MCHC RBC-ENTMCNC: 30 PG
MCHC RBC-ENTMCNC: 32.3 GM/DL
MCV RBC AUTO: 92.9 FL
MONOCYTES # BLD AUTO: 0.74 K/UL
MONOCYTES NFR BLD AUTO: 9.1 %
NEUTROPHILS # BLD AUTO: 5.13 K/UL
NEUTROPHILS NFR BLD AUTO: 63.3 %
NONHDLC SERPL-MCNC: 125 MG/DL
PLATELET # BLD AUTO: 248 K/UL
RBC # BLD: 4.77 M/UL
RBC # FLD: 12.7 %
T4 FREE SERPL-MCNC: 1.1 NG/DL
TRIGL SERPL-MCNC: 104 MG/DL
TSH SERPL-ACNC: 1.6 UIU/ML
WBC # FLD AUTO: 8.11 K/UL